# Patient Record
Sex: FEMALE | Race: OTHER | HISPANIC OR LATINO | URBAN - METROPOLITAN AREA
[De-identification: names, ages, dates, MRNs, and addresses within clinical notes are randomized per-mention and may not be internally consistent; named-entity substitution may affect disease eponyms.]

---
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
## 2023-01-13 ENCOUNTER — ASOB RESULT (OUTPATIENT)
Age: 18
End: 2023-01-13

## 2023-01-13 ENCOUNTER — APPOINTMENT (OUTPATIENT)
Dept: ANTEPARTUM | Facility: CLINIC | Age: 18
End: 2023-01-13
Payer: MEDICAID

## 2023-01-13 PROCEDURE — 76816 OB US FOLLOW-UP PER FETUS: CPT

## 2023-02-15 ENCOUNTER — ASOB RESULT (OUTPATIENT)
Age: 18
End: 2023-02-15

## 2023-02-15 ENCOUNTER — APPOINTMENT (OUTPATIENT)
Dept: ANTEPARTUM | Facility: CLINIC | Age: 18
End: 2023-02-15
Payer: MEDICAID

## 2023-02-15 PROCEDURE — 76816 OB US FOLLOW-UP PER FETUS: CPT

## 2023-02-15 PROCEDURE — 76818 FETAL BIOPHYS PROFILE W/NST: CPT | Mod: 59

## 2023-02-15 PROCEDURE — 76820 UMBILICAL ARTERY ECHO: CPT | Mod: 59

## 2023-02-24 ENCOUNTER — ASOB RESULT (OUTPATIENT)
Age: 18
End: 2023-02-24

## 2023-02-24 ENCOUNTER — APPOINTMENT (OUTPATIENT)
Dept: ANTEPARTUM | Facility: CLINIC | Age: 18
End: 2023-02-24
Payer: MEDICAID

## 2023-02-24 PROCEDURE — 76818 FETAL BIOPHYS PROFILE W/NST: CPT

## 2023-02-24 PROCEDURE — 76820 UMBILICAL ARTERY ECHO: CPT

## 2023-02-28 ENCOUNTER — APPOINTMENT (OUTPATIENT)
Dept: ANTEPARTUM | Facility: CLINIC | Age: 18
End: 2023-02-28
Payer: MEDICAID

## 2023-02-28 ENCOUNTER — ASOB RESULT (OUTPATIENT)
Age: 18
End: 2023-02-28

## 2023-02-28 PROCEDURE — 76818 FETAL BIOPHYS PROFILE W/NST: CPT

## 2023-02-28 PROCEDURE — 76820 UMBILICAL ARTERY ECHO: CPT

## 2023-02-28 PROCEDURE — ZZZZZ: CPT

## 2023-03-03 ENCOUNTER — APPOINTMENT (OUTPATIENT)
Dept: ANTEPARTUM | Facility: CLINIC | Age: 18
End: 2023-03-03
Payer: MEDICAID

## 2023-03-03 ENCOUNTER — ASOB RESULT (OUTPATIENT)
Age: 18
End: 2023-03-03

## 2023-03-03 PROCEDURE — 76818 FETAL BIOPHYS PROFILE W/NST: CPT

## 2023-03-07 ENCOUNTER — APPOINTMENT (OUTPATIENT)
Dept: ANTEPARTUM | Facility: CLINIC | Age: 18
End: 2023-03-07
Payer: MEDICAID

## 2023-03-07 ENCOUNTER — ASOB RESULT (OUTPATIENT)
Age: 18
End: 2023-03-07

## 2023-03-07 PROCEDURE — 76816 OB US FOLLOW-UP PER FETUS: CPT

## 2023-03-07 PROCEDURE — 76818 FETAL BIOPHYS PROFILE W/NST: CPT

## 2023-03-10 ENCOUNTER — APPOINTMENT (OUTPATIENT)
Dept: ANTEPARTUM | Facility: CLINIC | Age: 18
End: 2023-03-10

## 2023-03-14 ENCOUNTER — APPOINTMENT (OUTPATIENT)
Dept: ANTEPARTUM | Facility: CLINIC | Age: 18
End: 2023-03-14

## 2023-03-17 ENCOUNTER — APPOINTMENT (OUTPATIENT)
Dept: ANTEPARTUM | Facility: CLINIC | Age: 18
End: 2023-03-17

## 2023-03-21 ENCOUNTER — APPOINTMENT (OUTPATIENT)
Dept: ANTEPARTUM | Facility: CLINIC | Age: 18
End: 2023-03-21

## 2023-03-29 ENCOUNTER — INPATIENT (INPATIENT)
Facility: HOSPITAL | Age: 18
LOS: 2 days | Discharge: HOME | End: 2023-04-01
Attending: OBSTETRICS & GYNECOLOGY | Admitting: OBSTETRICS & GYNECOLOGY
Payer: COMMERCIAL

## 2023-03-29 VITALS
TEMPERATURE: 99 F | HEART RATE: 82 BPM | HEIGHT: 62 IN | RESPIRATION RATE: 17 BRPM | WEIGHT: 166.89 LBS | DIASTOLIC BLOOD PRESSURE: 67 MMHG | SYSTOLIC BLOOD PRESSURE: 108 MMHG

## 2023-03-29 LAB
ABO RH CONFIRMATION: SIGNIFICANT CHANGE UP
BASOPHILS # BLD AUTO: 0.1 K/UL — SIGNIFICANT CHANGE UP (ref 0–0.2)
BASOPHILS NFR BLD AUTO: 0.9 % — SIGNIFICANT CHANGE UP (ref 0–2)
BLD GP AB SCN SERPL QL: SIGNIFICANT CHANGE UP
EOSINOPHIL # BLD AUTO: 0 K/UL — SIGNIFICANT CHANGE UP (ref 0–0.5)
EOSINOPHIL NFR BLD AUTO: 0 % — SIGNIFICANT CHANGE UP (ref 0–6)
GIANT PLATELETS BLD QL SMEAR: PRESENT — SIGNIFICANT CHANGE UP
HCT VFR BLD CALC: 33.3 % — LOW (ref 34.5–45)
HGB BLD-MCNC: 11 G/DL — LOW (ref 11.5–15.5)
LYMPHOCYTES # BLD AUTO: 0.87 K/UL — LOW (ref 1–3.3)
LYMPHOCYTES # BLD AUTO: 7.9 % — LOW (ref 13–44)
MANUAL SMEAR VERIFICATION: SIGNIFICANT CHANGE UP
MCHC RBC-ENTMCNC: 30.5 PG — SIGNIFICANT CHANGE UP (ref 27–34)
MCHC RBC-ENTMCNC: 33 GM/DL — SIGNIFICANT CHANGE UP (ref 32–36)
MCV RBC AUTO: 92.2 FL — SIGNIFICANT CHANGE UP (ref 80–100)
METAMYELOCYTES # FLD: 0.9 % — HIGH (ref 0–0)
MONOCYTES # BLD AUTO: 0.88 K/UL — SIGNIFICANT CHANGE UP (ref 0–0.9)
MONOCYTES NFR BLD AUTO: 8 % — SIGNIFICANT CHANGE UP (ref 2–14)
MYELOCYTES NFR BLD: 0.9 % — HIGH (ref 0–0)
NEUTROPHILS # BLD AUTO: 8.98 K/UL — HIGH (ref 1.8–7.4)
NEUTROPHILS NFR BLD AUTO: 80.5 % — HIGH (ref 43–77)
NEUTS BAND # BLD: 0.9 % — SIGNIFICANT CHANGE UP (ref 0–8)
PLAT MORPH BLD: NORMAL — SIGNIFICANT CHANGE UP
PLATELET # BLD AUTO: 153 K/UL — SIGNIFICANT CHANGE UP (ref 150–400)
RBC # BLD: 3.61 M/UL — LOW (ref 3.8–5.2)
RBC # FLD: 15.4 % — HIGH (ref 10.3–14.5)
RBC BLD AUTO: NORMAL — SIGNIFICANT CHANGE UP
SARS-COV-2 RNA SPEC QL NAA+PROBE: SIGNIFICANT CHANGE UP
WBC # BLD: 11.03 K/UL — HIGH (ref 3.8–10.5)
WBC # FLD AUTO: 11.03 K/UL — HIGH (ref 3.8–10.5)

## 2023-03-29 RX ORDER — BUTORPHANOL TARTRATE 2 MG/ML
2 INJECTION, SOLUTION INTRAMUSCULAR; INTRAVENOUS ONCE
Refills: 0 | Status: DISCONTINUED | OUTPATIENT
Start: 2023-03-29 | End: 2023-03-29

## 2023-03-29 RX ORDER — OXYTOCIN 10 UNIT/ML
2 VIAL (ML) INJECTION
Qty: 30 | Refills: 0 | Status: DISCONTINUED | OUTPATIENT
Start: 2023-03-29 | End: 2023-04-01

## 2023-03-29 RX ORDER — SODIUM CHLORIDE 9 MG/ML
1000 INJECTION, SOLUTION INTRAVENOUS
Refills: 0 | Status: DISCONTINUED | OUTPATIENT
Start: 2023-03-29 | End: 2023-03-30

## 2023-03-29 RX ORDER — CITRIC ACID/SODIUM CITRATE 300-500 MG
30 SOLUTION, ORAL ORAL ONCE
Refills: 0 | Status: DISCONTINUED | OUTPATIENT
Start: 2023-03-29 | End: 2023-03-30

## 2023-03-29 RX ORDER — OXYTOCIN 10 UNIT/ML
333.33 VIAL (ML) INJECTION
Qty: 20 | Refills: 0 | Status: COMPLETED | OUTPATIENT
Start: 2023-03-29 | End: 2023-03-29

## 2023-03-29 RX ORDER — CHLORHEXIDINE GLUCONATE 213 G/1000ML
1 SOLUTION TOPICAL ONCE
Refills: 0 | Status: DISCONTINUED | OUTPATIENT
Start: 2023-03-29 | End: 2023-03-30

## 2023-03-29 RX ORDER — INFLUENZA VIRUS VACCINE 15; 15; 15; 15 UG/.5ML; UG/.5ML; UG/.5ML; UG/.5ML
0.5 SUSPENSION INTRAMUSCULAR ONCE
Refills: 0 | Status: COMPLETED | OUTPATIENT
Start: 2023-03-29 | End: 2023-03-31

## 2023-03-29 RX ADMIN — BUTORPHANOL TARTRATE 2 MILLIGRAM(S): 2 INJECTION, SOLUTION INTRAMUSCULAR; INTRAVENOUS at 22:11

## 2023-03-29 RX ADMIN — Medication 2 MILLIUNIT(S)/MIN: at 18:43

## 2023-03-29 RX ADMIN — SODIUM CHLORIDE 125 MILLILITER(S): 9 INJECTION, SOLUTION INTRAVENOUS at 15:54

## 2023-03-29 NOTE — OB PROVIDER H&P - HISTORY OF PRESENT ILLNESS
18y  at 41 weeks 0 days GA by LMP consistent with 3rd trimester sono who presents to L&D for induction of labor. Patient denies vaginal bleeding, contractions and leakage of fluid. She endorses good fetal movement. Denies fevers, chills, nausea, vomiting, chest pain, SOB, dizziness and headache. No other complaints at this time.   TERRY: 3/22/23  LMP: 6/15/22  Prenatal course is significant for: EIF on fetal ultrasound, followed-up and found to be unconcerning/resolved    POB: No prior pregnancies, no hx of gestational hypertension or diabetes.  PGYN: -fibroids, -ovarian cysts, denies STD hx, denies abnormal PAPs   PMH: Denies any past medical history  PSH: No past surgical hx  SH: Denies EtOH, tobacco and illicit drug use during this pregnancy; feels safe at home   Meds: PNVs  Allergies: NKDA    BMI: 30.5  Sono: Last known 3/8 - AIF -13.9, BPP: 10/10  EFW: 18th percentile (on 3/8)      History obtained with the assistance of video , ID # [id number].  18y  at 41 weeks 0 days GA by LMP consistent with 3rd trimester sono who presents to L&D for induction of labor. Patient denies vaginal bleeding, contractions and leakage of fluid. She endorses good fetal movement. Denies fevers, chills, nausea, vomiting, chest pain, SOB, dizziness and headache. No other complaints at this time.   TERRY: 3/22/23  LMP: 6/15/22  Prenatal course is uncomplicated.     POB: No prior pregnancies, no hx of gestational hypertension or diabetes.  PGYN: -fibroids, -ovarian cysts, denies STD hx, denies abnormal PAPs   PMH: Denies any past medical history  PSH: No past surgical hx  SH: Denies EtOH, tobacco and illicit drug use during this pregnancy; feels safe at home   Meds: PNVs  Allergies: NKDA    BMI: 30.5  EFW: 18th percentile (on 3/8)      History obtained with the assistance of video , ID # [id number].

## 2023-03-29 NOTE — OB PROVIDER H&P - NSHPPHYSICALEXAM_GEN_ALL_CORE
T(C): 37.2 (03-29-23 @ 14:53), Max: 37.2 (03-29-23 @ 14:53)  HR: 82 (03-29-23 @ 15:06) (82 - 82)  BP: 108/67 (03-29-23 @ 15:06) (108/67 - 108/67)  RR: 17 (03-29-23 @ 14:53) (17 - 17)  SpO2: --    Gen: NAD, well-appearing, AAOx3   Abd: Soft, gravid  Ext: non-tender, non-edematous  SSE:   SVE:    Bedside sono:  FHT:  [place]: T(C): 37.2 (03-29-23 @ 14:53), Max: 37.2 (03-29-23 @ 14:53)  HR: 82 (03-29-23 @ 15:06) (82 - 82)  BP: 108/67 (03-29-23 @ 15:06) (108/67 - 108/67)  RR: 17 (03-29-23 @ 14:53) (17 - 17)    Gen: NAD, well-appearing, AAOx3   Abd: Soft, gravid  Ext: non-tender, non-edematous  SVE: 4.5/80/-2  Bedside sono: vertex  FHT: baseline 130s, moderate variability, +accels, -decels   Quimby: lauren regularly q 6 minutes

## 2023-03-29 NOTE — OB PROVIDER H&P - NS_OBGYNHISTORY_OBGYN_ALL_OB_FT
No prior pregnancies, no history of gestational hypertension or diabetes. Denies history of fibroids, cysts, or abnormal pap smears. Denies history of STDs.

## 2023-03-29 NOTE — OB PROVIDER H&P - ASSESSMENT
Assessment: Patient is a 18 year-old  woman with an uncomplicated pregnancy presenting for induction of labor.     Plan:  -Admit to L&D  -Consent  -Admission labs  -NPO, except ice chips   -IV fluids  -Labor:  Not currently in active labor. Counseled patient on options for induction and that choice will be made based on cervical exam and US findings.  -Fetus: Cat I tracing. Continuous toco and fetal monitoring.   -GBS: Negative, no GBS ppx required   -Analgesia: Patient does not want analgesia at this time, informed that she can change her mind and let us know.     Discussed with  _ A/P: Patient is a 18 year-old  at 41 weeks GA who is being admitted for a late term induction of labor.   -Admit to L&D  -Consent  -Admission labs  -NPO, except ice chips   -IV fluids  -Labor: Intact. Will start induction of labor with pitocin.   -Fetus: Cat I tracing. Continuous toco and fetal monitoring.   -GBS: Negative, no GBS ppx required   -Analgesia: Patient does not want analgesia at this time, informed that she can change her mind and let us know.     Discussed with Dr. Redmond.

## 2023-03-29 NOTE — OB PROVIDER H&P - ATTENDING COMMENTS
G1 @ 41wks here for IOL for PD. GBSneg. Maternal/fetal status reassuring.    -admit  -favorable dorado score: start pitocin  -regular diet  -routine admission orders  -pain mgmt, labor and delivery were discussed in detail  -consents explained and signed, all questions and concerns answered  ppalos

## 2023-03-29 NOTE — OB PROVIDER H&P - NSHPSOCIALHISTORY_GEN_ALL_CORE
No history of tobacco, alcohol, illicit drug use during pregnancy. Patient reports feeling safe at home.

## 2023-03-30 ENCOUNTER — TRANSCRIPTION ENCOUNTER (OUTPATIENT)
Age: 18
End: 2023-03-30

## 2023-03-30 LAB
COVID-19 SPIKE DOMAIN AB INTERP: POSITIVE
COVID-19 SPIKE DOMAIN ANTIBODY RESULT: >250 U/ML — HIGH
MEV IGG SER-ACNC: 132 AU/ML — SIGNIFICANT CHANGE UP
MEV IGG+IGM SER-IMP: POSITIVE — SIGNIFICANT CHANGE UP
SARS-COV-2 IGG+IGM SERPL QL IA: >250 U/ML — HIGH
SARS-COV-2 IGG+IGM SERPL QL IA: POSITIVE
T PALLIDUM AB TITR SER: NEGATIVE — SIGNIFICANT CHANGE UP

## 2023-03-30 RX ORDER — OXYCODONE HYDROCHLORIDE 5 MG/1
5 TABLET ORAL
Refills: 0 | Status: DISCONTINUED | OUTPATIENT
Start: 2023-03-30 | End: 2023-04-01

## 2023-03-30 RX ORDER — SODIUM CHLORIDE 9 MG/ML
3 INJECTION INTRAMUSCULAR; INTRAVENOUS; SUBCUTANEOUS EVERY 8 HOURS
Refills: 0 | Status: DISCONTINUED | OUTPATIENT
Start: 2023-03-30 | End: 2023-04-01

## 2023-03-30 RX ORDER — MAGNESIUM HYDROXIDE 400 MG/1
30 TABLET, CHEWABLE ORAL
Refills: 0 | Status: DISCONTINUED | OUTPATIENT
Start: 2023-03-30 | End: 2023-04-01

## 2023-03-30 RX ORDER — HYDROCORTISONE 1 %
1 OINTMENT (GRAM) TOPICAL EVERY 6 HOURS
Refills: 0 | Status: DISCONTINUED | OUTPATIENT
Start: 2023-03-30 | End: 2023-04-01

## 2023-03-30 RX ORDER — AER TRAVELER 0.5 G/1
1 SOLUTION RECTAL; TOPICAL EVERY 4 HOURS
Refills: 0 | Status: DISCONTINUED | OUTPATIENT
Start: 2023-03-30 | End: 2023-04-01

## 2023-03-30 RX ORDER — OXYCODONE HYDROCHLORIDE 5 MG/1
5 TABLET ORAL ONCE
Refills: 0 | Status: DISCONTINUED | OUTPATIENT
Start: 2023-03-30 | End: 2023-04-01

## 2023-03-30 RX ORDER — OXYTOCIN 10 UNIT/ML
41.67 VIAL (ML) INJECTION
Qty: 20 | Refills: 0 | Status: DISCONTINUED | OUTPATIENT
Start: 2023-03-30 | End: 2023-04-01

## 2023-03-30 RX ORDER — ACETAMINOPHEN 500 MG
975 TABLET ORAL
Refills: 0 | Status: DISCONTINUED | OUTPATIENT
Start: 2023-03-30 | End: 2023-04-01

## 2023-03-30 RX ORDER — LANOLIN
1 OINTMENT (GRAM) TOPICAL EVERY 6 HOURS
Refills: 0 | Status: DISCONTINUED | OUTPATIENT
Start: 2023-03-30 | End: 2023-04-01

## 2023-03-30 RX ORDER — DIBUCAINE 1 %
1 OINTMENT (GRAM) RECTAL EVERY 6 HOURS
Refills: 0 | Status: DISCONTINUED | OUTPATIENT
Start: 2023-03-30 | End: 2023-04-01

## 2023-03-30 RX ORDER — IBUPROFEN 200 MG
600 TABLET ORAL EVERY 6 HOURS
Refills: 0 | Status: COMPLETED | OUTPATIENT
Start: 2023-03-30 | End: 2024-02-26

## 2023-03-30 RX ORDER — TETANUS TOXOID, REDUCED DIPHTHERIA TOXOID AND ACELLULAR PERTUSSIS VACCINE, ADSORBED 5; 2.5; 8; 8; 2.5 [IU]/.5ML; [IU]/.5ML; UG/.5ML; UG/.5ML; UG/.5ML
0.5 SUSPENSION INTRAMUSCULAR ONCE
Refills: 0 | Status: DISCONTINUED | OUTPATIENT
Start: 2023-03-30 | End: 2023-04-01

## 2023-03-30 RX ORDER — SIMETHICONE 80 MG/1
80 TABLET, CHEWABLE ORAL EVERY 4 HOURS
Refills: 0 | Status: DISCONTINUED | OUTPATIENT
Start: 2023-03-30 | End: 2023-04-01

## 2023-03-30 RX ORDER — DIPHENHYDRAMINE HCL 50 MG
25 CAPSULE ORAL EVERY 6 HOURS
Refills: 0 | Status: DISCONTINUED | OUTPATIENT
Start: 2023-03-30 | End: 2023-04-01

## 2023-03-30 RX ORDER — IBUPROFEN 200 MG
600 TABLET ORAL EVERY 6 HOURS
Refills: 0 | Status: DISCONTINUED | OUTPATIENT
Start: 2023-03-30 | End: 2023-04-01

## 2023-03-30 RX ORDER — KETOROLAC TROMETHAMINE 30 MG/ML
30 SYRINGE (ML) INJECTION ONCE
Refills: 0 | Status: DISCONTINUED | OUTPATIENT
Start: 2023-03-30 | End: 2023-03-30

## 2023-03-30 RX ORDER — BENZOCAINE 10 %
1 GEL (GRAM) MUCOUS MEMBRANE EVERY 6 HOURS
Refills: 0 | Status: DISCONTINUED | OUTPATIENT
Start: 2023-03-30 | End: 2023-04-01

## 2023-03-30 RX ORDER — PRAMOXINE HYDROCHLORIDE 150 MG/15G
1 AEROSOL, FOAM RECTAL EVERY 4 HOURS
Refills: 0 | Status: DISCONTINUED | OUTPATIENT
Start: 2023-03-30 | End: 2023-04-01

## 2023-03-30 RX ADMIN — BUTORPHANOL TARTRATE 2 MILLIGRAM(S): 2 INJECTION, SOLUTION INTRAMUSCULAR; INTRAVENOUS at 07:11

## 2023-03-30 RX ADMIN — Medication 600 MILLIGRAM(S): at 23:47

## 2023-03-30 RX ADMIN — Medication 975 MILLIGRAM(S): at 08:55

## 2023-03-30 RX ADMIN — Medication 600 MILLIGRAM(S): at 23:44

## 2023-03-30 RX ADMIN — Medication 975 MILLIGRAM(S): at 14:24

## 2023-03-30 RX ADMIN — Medication 600 MILLIGRAM(S): at 17:35

## 2023-03-30 RX ADMIN — Medication 600 MILLIGRAM(S): at 12:00

## 2023-03-30 RX ADMIN — Medication 30 MILLIGRAM(S): at 07:11

## 2023-03-30 RX ADMIN — Medication 1 TABLET(S): at 12:01

## 2023-03-30 RX ADMIN — Medication 30 MILLIGRAM(S): at 05:37

## 2023-03-30 RX ADMIN — Medication 975 MILLIGRAM(S): at 20:46

## 2023-03-30 RX ADMIN — Medication 1000 MILLIUNIT(S)/MIN: at 05:37

## 2023-03-30 RX ADMIN — Medication 975 MILLIGRAM(S): at 20:47

## 2023-03-30 NOTE — DISCHARGE NOTE OB - PATIENT PORTAL LINK FT
You can access the FollowMyHealth Patient Portal offered by [facility] by registering at the following website: [email or web address]. By joining [facility]’s FollowMyHealth portal, you will also be able to view your health information using other applications (apps) compatible with our system.

## 2023-03-30 NOTE — OB RN DELIVERY SUMMARY - NS_SEPSISRSKCALC_OBGYN_ALL_OB_FT
EOS calculated successfully. EOS Risk Factor: 0.5/1000 live births ([facility] national incidence); GA=41w1d; Temp=99; ROM=3.633; GBS='Negative'; Antibiotics='No antibiotics or any antibiotics < 2 hrs prior to birth'

## 2023-03-30 NOTE — DISCHARGE NOTE OB - HOSPITAL COURSE
She is a 19 yo now  who presented at 41w1d GA for induction of labor and had a normal delivery. She had a normal postpartum course and was discharged home in stable condition. Upon discharge she was voiding, tolerating PO, and ambulating.

## 2023-03-30 NOTE — DISCHARGE NOTE OB - PROVIDER TOKENS
FREE:[LAST:[[facility]],PHONE:[phone]],FAX:[(   )    -],ADDRESS:[[address] [place] [address]],ESTABLISHEDPATIENT:[T]]

## 2023-03-30 NOTE — OB PROVIDER DELIVERY SUMMARY - NSPROVIDERDELIVERYNOTE_OBGYN_ALL_OB_FT
Patient felt rectal pressure and was found to be fully dilated, 0 station. She pushed effectively for 60 minutes, and delivered a viable male infant at 0338. Vertex delivered without difficulty, no nuchal cord noted, both shoulders then delivered without difficulty.  Delayed cord clamping for approximately 30 seconds, and skin to skin was initiated. Cord segment was clamped and cut for cord blood and gas collection. Placenta delivered spontaneously and intact at 0341. Pitocin started. Uterus, cervix, perineum and vagina were inspected and a second degree laceration was noted and repaired with 2-0 chromic. Excellent hemostasis was achieved. Apgars 9,9.

## 2023-03-30 NOTE — OB RN DELIVERY SUMMARY - BABY A: APGAR 1 MIN RESP RATE, DELIVERY
(2) good, crying Cheek To Nose Interpolation Flap Division And Inset Text: Division and inset of the cheek to nose interpolation flap was performed to achieve optimal aesthetic result, restore normal anatomic appearance and avoid distortion of normal anatomy, expedite and facilitate wound healing, achieve optimal functional result and because linear closure either not possible or would produce suboptimal result. The patient was prepped and draped in the usual manner. The pedicle was infiltrated with local anesthesia. The pedicle was sectioned with a #15 blade. The pedicle was de-bulked and trimmed to match the shape of the defect. Hemostasis was achieved. The flap donor site and free margin of the flap were secured with deep buried sutures and the wound edges were re-approximated.

## 2023-03-30 NOTE — DISCHARGE NOTE OB - MEDICATION SUMMARY - MEDICATIONS TO TAKE
I will START or STAY ON the medications listed below when I get home from the hospital:    acetaminophen 325 mg oral tablet  -- 3 tab(s) by mouth every 6 hours as needed for pain  -- Indication: For pain    ibuprofen 600 mg oral tablet  -- 1 tab(s) by mouth every 6 hours as needed for pain  -- Indication: For pain    Prenatal Multivitamins with Folic Acid 1 mg oral tablet  -- 1 tab(s) by mouth once a day  -- Indication: For postpartum recovery

## 2023-03-30 NOTE — OB RN DELIVERY SUMMARY - NSSELHIDDEN_OBGYN_ALL_OB_FT
[[email or web address]_OB_FT:[id number]],[NS_DeliveryAssist1_OBGYN_ALL_OB_FT:[id number]],[NS_DeliveryRN_OBGYN_ALL_OB_FT:[id number]=]

## 2023-03-30 NOTE — OB RN DELIVERY SUMMARY - BABY A: APGAR 1 MIN COLOR, DELIVERY
You sugars and lipids are creeping up. You have a 3.0% chance of developing heart disease based on your current risks. Because of this I suggest:      Focus on regular exercise (150 minutes each week) and healthy eating. Eat more fruits and vegetables. Eat more protein (egg whites, beans, and nuts you know you tolerate) and less carbohydrates (white bread, white rice, white pasta, white potatoes, sodas, and sweets). Eat appropriately small portion sizes. (1) body pink, extremities blue

## 2023-03-30 NOTE — OB PROVIDER DELIVERY SUMMARY - NSLOWPPHRISK_OBGYN_A_OB
No previous uterine incision/Stallings Pregnancy/No known bleeding disorder/No other PPH risks indicated

## 2023-03-30 NOTE — OB PROVIDER DELIVERY SUMMARY - NSSELHIDDEN_OBGYN_ALL_OB_FT
[[email or web address]_ALL_OB_FT:[id number]],[NS_DeliveryAssist1_OBGYN_ALL_OB_FT:[id number]],[NS_DeliveryRN_OBGYN_ALL_OB_FT:[id number]=]

## 2023-03-30 NOTE — DISCHARGE NOTE OB - CARE PROVIDER_API CALL
[facility],   [address] [place] [address]  Phone: [phone]  Fax: (   )    -  Established Patient  Follow Up Time:

## 2023-03-31 LAB
HCT VFR BLD CALC: 25.5 % — LOW (ref 34.5–45)
HGB BLD-MCNC: 8.3 G/DL — LOW (ref 11.5–15.5)

## 2023-03-31 RX ADMIN — INFLUENZA VIRUS VACCINE 0.5 MILLILITER(S): 15; 15; 15; 15 SUSPENSION INTRAMUSCULAR at 05:04

## 2023-03-31 RX ADMIN — Medication 600 MILLIGRAM(S): at 18:25

## 2023-03-31 RX ADMIN — Medication 600 MILLIGRAM(S): at 13:07

## 2023-03-31 RX ADMIN — Medication 975 MILLIGRAM(S): at 21:54

## 2023-03-31 RX ADMIN — Medication 1 TABLET(S): at 13:07

## 2023-03-31 RX ADMIN — Medication 975 MILLIGRAM(S): at 02:23

## 2023-03-31 RX ADMIN — Medication 975 MILLIGRAM(S): at 02:18

## 2023-03-31 RX ADMIN — Medication 600 MILLIGRAM(S): at 05:07

## 2023-03-31 RX ADMIN — Medication 600 MILLIGRAM(S): at 05:04

## 2023-04-01 VITALS
TEMPERATURE: 98 F | DIASTOLIC BLOOD PRESSURE: 73 MMHG | RESPIRATION RATE: 18 BRPM | SYSTOLIC BLOOD PRESSURE: 107 MMHG | HEART RATE: 76 BPM | OXYGEN SATURATION: 100 %

## 2023-04-01 PROCEDURE — 86769 SARS-COV-2 COVID-19 ANTIBODY: CPT

## 2023-04-01 PROCEDURE — 86900 BLOOD TYPING SEROLOGIC ABO: CPT

## 2023-04-01 PROCEDURE — 90686 IIV4 VACC NO PRSV 0.5 ML IM: CPT

## 2023-04-01 PROCEDURE — 85018 HEMOGLOBIN: CPT

## 2023-04-01 PROCEDURE — U0005: CPT

## 2023-04-01 PROCEDURE — 86901 BLOOD TYPING SEROLOGIC RH(D): CPT

## 2023-04-01 PROCEDURE — 85014 HEMATOCRIT: CPT

## 2023-04-01 PROCEDURE — 85025 COMPLETE CBC W/AUTO DIFF WBC: CPT

## 2023-04-01 PROCEDURE — 36415 COLL VENOUS BLD VENIPUNCTURE: CPT

## 2023-04-01 PROCEDURE — U0003: CPT

## 2023-04-01 PROCEDURE — 86780 TREPONEMA PALLIDUM: CPT

## 2023-04-01 PROCEDURE — 86765 RUBEOLA ANTIBODY: CPT

## 2023-04-01 PROCEDURE — 86850 RBC ANTIBODY SCREEN: CPT

## 2023-04-01 PROCEDURE — T1013: CPT

## 2023-04-01 RX ORDER — ACETAMINOPHEN 500 MG
3 TABLET ORAL
Qty: 48 | Refills: 0
Start: 2023-04-01 | End: 2023-04-04

## 2023-04-01 RX ORDER — IBUPROFEN 200 MG
1 TABLET ORAL
Qty: 16 | Refills: 0
Start: 2023-04-01 | End: 2023-04-04

## 2023-04-01 RX ADMIN — Medication 600 MILLIGRAM(S): at 00:56

## 2023-04-01 RX ADMIN — Medication 1 TABLET(S): at 11:27

## 2023-04-01 RX ADMIN — Medication 975 MILLIGRAM(S): at 03:50

## 2023-04-01 RX ADMIN — Medication 600 MILLIGRAM(S): at 11:27

## 2023-04-01 RX ADMIN — Medication 600 MILLIGRAM(S): at 06:27

## 2023-04-01 NOTE — PROGRESS NOTE ADULT - ATTENDING COMMENTS
PPD#2  Doing well  meeting milestones  Hgb 11 --> 8.3- no signs of anemia  precautions provided  f/u in the office in 4-6 weeks  discharge home today  ppalos

## 2023-04-01 NOTE — PROGRESS NOTE ADULT - ASSESSMENT
A/P:  PHILLIP MAYS is a 18y  now PPD#1 s/p spontaneous vaginal delivery at 41w1d gestation, uncomplicated.  -Vital signs stable  -Hgb: 11.0 -> AM labs pending   -Voiding, tolerating PO  -Advance care as tolerated   -Continue routine postpartum care and education  -Healthy male infant, declines circumcision  -Dispo: Continue with inpatient management and consider for late discharge today
  A/P:  PHILLIP MAYS is a 18y  now PPD#2 s/p spontaneous vaginal delivery at 41w1d gestation, uncomplicated.  -Vital signs stable  -Hgb: 11.0 ->8.3  -Voiding, tolerating PO  -Advance care as tolerated   -Continue routine postpartum care and education  -Healthy male infant, declines circumcision  -Dispo: Consider for discharge home today

## 2023-04-01 NOTE — PROGRESS NOTE ADULT - SUBJECTIVE AND OBJECTIVE BOX
PHILLIP MAYS is a 18y  now PPD#2 s/p spontaneous vaginal delivery at 41w1d gestation, uncomplicated.    S:    No acute events overnight.   The patient has no complaints.  Pain controlled with current treatment regimen.   She is ambulating without difficulty and tolerating PO.   + flatus/-BM/+ voiding   She endorses appropriate lochia, which is decreasing.   She is breastfeeding without difficulty.   She denies fevers, chills, nausea and vomiting.   She denies lightheadedness, dizziness, palpitations, chest pain and SOB.     O:    Vital Signs Last 24 Hrs  T(C): 36.8 (2023 04:00), Max: 36.9 (31 Mar 2023 15:31)  T(F): 98.2 (2023 04:00), Max: 98.4 (31 Mar 2023 15:31)  HR: 74 (2023 04:00) (70 - 84)  BP: 120/77 (2023 04:00) (94/57 - 120/77)  RR: 16 (2023 04:00) (16 - 18)  SpO2: 99% (2023 04:00) (98% - 100%)        Gen: NAD, AOx3, resting comfortably on room air   Abdomen:  Soft, non-tender, non-distended  Uterus:  Fundus firm below umbilicus  VE:  Expected lochia  Ext:  b/l LE non-tender                                      8.3    x     )-----------( x        ( 31 Mar 2023 05:05 )             25.5              
18y Female s/p labor epidural, CSE on 03/30/2023    Vital Signs     T(C): 36.8 (31 Mar 2023 02:43), Max: 36.9 (30 Mar 2023 15:37)  T(F): 98.2 (31 Mar 2023 02:43), Max: 98.4 (30 Mar 2023 15:37)  HR: 77 (31 Mar 2023 02:43) (67 - 77)  BP: 112/71 (31 Mar 2023 02:43) (98/61 - 112/71)  BP(mean): --  RR: 17 (31 Mar 2023 02:43) (17 - 18)  SpO2: 100% (31 Mar 2023 02:43) (100% - 100%)    Parameters below as of 31 Mar 2023 02:43    Patient On (Oxygen Delivery Method): room air            Patient's overall anesthesia satisfaction: Positive    Patient seen and doing well     No headache      No residual numbness or weakness, sensory and motor function intact      No anesthetic complications or complaints noted or reported                 
PHILLIP MAYS is a 18y  now PPD#1 s/p spontaneous vaginal delivery at 41w1d gestation, uncomplicated.    S:    No acute events overnight.   The patient has no complaints.  Pain controlled with current treatment regimen.   She is ambulating without difficulty and tolerating PO.   + flatus/-BM/+ voiding   She endorses appropriate lochia, which is decreasing.   She is breastfeeding without difficulty.   She denies fevers, chills, nausea and vomiting.   She denies lightheadedness, dizziness, palpitations, chest pain and SOB.     O:    T(C): 36.8 (23 @ 02:43), Max: 36.9 (23 @ 15:37)  HR: 77 (23 @ 02:43) (66 - 77)  BP: 112/71 (23 @ 02:43) (97/60 - 112/71)  RR: 17 (23 @ 02:43) (17 - 18)  SpO2: 100% (23 @ 02:43) (98% - 100%)    Gen: NAD, AOx3, resting comfortably on room air   Abdomen:  Soft, non-tender, non-distended  Uterus:  Fundus firm below umbilicus  VE:  Expected lochia  Ext:  b/l LE non-tender                           11.0   11.03 )-----------( 153      ( 29 Mar 2023 15:57 )             33.3

## 2023-04-04 ENCOUNTER — APPOINTMENT (OUTPATIENT)
Age: 18
End: 2023-04-04

## 2023-11-19 PROBLEM — Z78.9 OTHER SPECIFIED HEALTH STATUS: Chronic | Status: ACTIVE | Noted: 2023-03-29

## 2024-03-06 ENCOUNTER — APPOINTMENT (OUTPATIENT)
Dept: DERMATOLOGY | Facility: CLINIC | Age: 19
End: 2024-03-06
Payer: MEDICAID

## 2024-03-06 PROCEDURE — 99204 OFFICE O/P NEW MOD 45 MIN: CPT

## 2024-08-30 NOTE — OB RN PATIENT PROFILE - FUNCTIONAL ASSESSMENT - BASIC MOBILITY 4.
David Weston,     -Stable blood counts. No signs of anemia.     -Your comprehensive metabolic panel which includes tests of liver function (ALT, AST, total bilirubin, alkaline phosphatase), kidney function (creatinine, BUN), electrolytes (sodium, potassium, calcium), and blood sugar (glucose) returned stable for you.    -TSH (thyroid stimulating hormone) level is normal which indicates normal circulating thyroid hormone levels.     -Cholesterol levels are satisfactory    -Normal iron stores    Overall a great report!     Let me know if you have any questions or concerns,     Leroy Zelaya PA-C  [facility]   4 = No assist / stand by assistance

## 2025-06-12 ENCOUNTER — APPOINTMENT (OUTPATIENT)
Dept: ANTEPARTUM | Facility: CLINIC | Age: 20
End: 2025-06-12
Payer: MEDICAID

## 2025-06-12 ENCOUNTER — ASOB RESULT (OUTPATIENT)
Age: 20
End: 2025-06-12

## 2025-06-12 PROCEDURE — 76801 OB US < 14 WKS SINGLE FETUS: CPT

## 2025-07-09 ENCOUNTER — APPOINTMENT (OUTPATIENT)
Dept: MATERNAL FETAL MEDICINE | Facility: CLINIC | Age: 20
End: 2025-07-09
Payer: MEDICAID

## 2025-07-09 ENCOUNTER — ASOB RESULT (OUTPATIENT)
Age: 20
End: 2025-07-09

## 2025-07-09 ENCOUNTER — APPOINTMENT (OUTPATIENT)
Dept: ANTEPARTUM | Facility: CLINIC | Age: 20
End: 2025-07-09
Payer: MEDICAID

## 2025-07-09 VITALS
DIASTOLIC BLOOD PRESSURE: 52 MMHG | BODY MASS INDEX: 30.91 KG/M2 | RESPIRATION RATE: 16 BRPM | WEIGHT: 168 LBS | HEIGHT: 62 IN | HEART RATE: 67 BPM | OXYGEN SATURATION: 98 % | SYSTOLIC BLOOD PRESSURE: 92 MMHG

## 2025-07-09 PROBLEM — O99.210 OBESITY IN PREGNANCY: Status: ACTIVE | Noted: 2025-07-09

## 2025-07-09 PROBLEM — R78.71 ELEVATED BLOOD LEAD LEVEL: Status: ACTIVE | Noted: 2025-07-09

## 2025-07-09 PROBLEM — Z83.3 FAMILY HISTORY OF DIABETES MELLITUS: Status: ACTIVE | Noted: 2025-07-09

## 2025-07-09 PROBLEM — Z3A.13 13 WEEKS GESTATION OF PREGNANCY: Status: ACTIVE | Noted: 2025-07-09

## 2025-07-09 PROCEDURE — 99205 OFFICE O/P NEW HI 60 MIN: CPT | Mod: TH

## 2025-07-09 PROCEDURE — 76813 OB US NUCHAL MEAS 1 GEST: CPT

## 2025-07-09 RX ORDER — VITAMIN C, CALCIUM, IRON, VITAMIN D3, VITAMIN E, VITAMIN B1, VITAMIN B2, VITAMIN B3, VITAMIN B6, FOLIC ACID, IODINE, ZINC, COPPER, DOCUSATE SODIUM, DOCOSAHEXAENOIC ACID (DHA) 27-1-50 MG
KIT ORAL
Refills: 0 | Status: ACTIVE | COMMUNITY

## 2025-07-11 LAB — LEAD BLD-MCNC: 5.5 UG/DL

## 2025-08-27 ENCOUNTER — ASOB RESULT (OUTPATIENT)
Age: 20
End: 2025-08-27

## 2025-08-27 ENCOUNTER — APPOINTMENT (OUTPATIENT)
Dept: ANTEPARTUM | Facility: CLINIC | Age: 20
End: 2025-08-27

## 2025-08-27 PROCEDURE — 76811 OB US DETAILED SNGL FETUS: CPT

## 2025-08-27 PROCEDURE — 76817 TRANSVAGINAL US OBSTETRIC: CPT
